# Patient Record
Sex: MALE | Race: WHITE | NOT HISPANIC OR LATINO | URBAN - METROPOLITAN AREA
[De-identification: names, ages, dates, MRNs, and addresses within clinical notes are randomized per-mention and may not be internally consistent; named-entity substitution may affect disease eponyms.]

---

## 2019-10-14 ENCOUNTER — APPOINTMENT (OUTPATIENT)
Dept: URBAN - METROPOLITAN AREA SURGERY 18 | Age: 84
Setting detail: DERMATOLOGY
End: 2019-10-15

## 2019-10-14 VITALS — SYSTOLIC BLOOD PRESSURE: 163 MMHG | HEART RATE: 78 BPM | DIASTOLIC BLOOD PRESSURE: 73 MMHG

## 2019-10-14 PROBLEM — C44.229 SQUAMOUS CELL CARCINOMA OF SKIN OF LEFT EAR AND EXTERNAL AURICULAR CANAL: Status: ACTIVE | Noted: 2019-10-14

## 2019-10-14 PROCEDURE — 14061 TIS TRNFR E/N/E/L10.1-30SQCM: CPT

## 2019-10-14 PROCEDURE — 17311 MOHS 1 STAGE H/N/HF/G: CPT

## 2019-10-14 PROCEDURE — OTHER MOHS SURGERY: OTHER

## 2019-10-14 PROCEDURE — OTHER COUNSELING: OTHER

## 2019-10-14 PROCEDURE — OTHER REFERRAL CORRESPONDENCE: OTHER

## 2019-10-14 PROCEDURE — 17312 MOHS ADDL STAGE: CPT

## 2019-10-22 ENCOUNTER — APPOINTMENT (OUTPATIENT)
Dept: URBAN - METROPOLITAN AREA SURGERY 18 | Age: 84
Setting detail: DERMATOLOGY
End: 2019-10-22

## 2019-10-22 DIAGNOSIS — Z48.817 ENCOUNTER FOR SURGICAL AFTERCARE FOLLOWING SURGERY ON THE SKIN AND SUBCUTANEOUS TISSUE: ICD-10-CM

## 2019-10-22 PROBLEM — H91.90 UNSPECIFIED HEARING LOSS, UNSPECIFIED EAR: Status: ACTIVE | Noted: 2019-10-22

## 2019-10-22 PROBLEM — I25.10 ATHEROSCLEROTIC HEART DISEASE OF NATIVE CORONARY ARTERY WITHOUT ANGINA PECTORIS: Status: ACTIVE | Noted: 2019-10-22

## 2019-10-22 PROCEDURE — OTHER POST-OP WOUND CHECK: OTHER

## 2019-10-22 PROCEDURE — 99024 POSTOP FOLLOW-UP VISIT: CPT

## 2019-10-22 ASSESSMENT — LOCATION ZONE DERM: LOCATION ZONE: EAR

## 2019-10-22 ASSESSMENT — LOCATION DETAILED DESCRIPTION DERM: LOCATION DETAILED: LEFT SUPERIOR POSTERIOR HELIX

## 2019-10-22 ASSESSMENT — LOCATION SIMPLE DESCRIPTION DERM: LOCATION SIMPLE: LEFT EAR

## 2019-10-22 NOTE — PROCEDURE: POST-OP WOUND CHECK
Add 04260 Cpt? (Important Note: In 2017 The Use Of 53488 Is Being Tracked By Cms To Determine Future Global Period Reimbursement For Global Periods): yes
Additional Comments: Patient with no concerns or pain, instructed patient to keep applied bandage on for one week and then he can switch to daily dressing changes. Follow up is PRN.\\n\\nDr. Atif's note - acutely well healed, no signs/symptoms infection/hematoma/poor healing or other postoperative complications. Continued care reviewed. Follow up with any concerns at surgical site.
Wound Evaluated By: Dr. Cornelius Srivastava
Detail Level: Detailed
